# Patient Record
Sex: FEMALE | Race: WHITE | NOT HISPANIC OR LATINO | Employment: OTHER
[De-identification: names, ages, dates, MRNs, and addresses within clinical notes are randomized per-mention and may not be internally consistent; named-entity substitution may affect disease eponyms.]

---

## 2017-06-03 ENCOUNTER — HEALTH MAINTENANCE LETTER (OUTPATIENT)
Age: 67
End: 2017-06-03

## 2017-08-31 ENCOUNTER — APPOINTMENT (OUTPATIENT)
Dept: CT IMAGING | Facility: CLINIC | Age: 67
End: 2017-08-31
Attending: FAMILY MEDICINE
Payer: MEDICARE

## 2017-08-31 ENCOUNTER — HOSPITAL ENCOUNTER (EMERGENCY)
Facility: CLINIC | Age: 67
Discharge: HOME | End: 2017-08-31
Attending: FAMILY MEDICINE | Admitting: FAMILY MEDICINE
Payer: MEDICARE

## 2017-08-31 VITALS
OXYGEN SATURATION: 95 % | HEIGHT: 63 IN | BODY MASS INDEX: 28.53 KG/M2 | RESPIRATION RATE: 20 BRPM | WEIGHT: 161 LBS | TEMPERATURE: 98 F | DIASTOLIC BLOOD PRESSURE: 71 MMHG | SYSTOLIC BLOOD PRESSURE: 133 MMHG

## 2017-08-31 DIAGNOSIS — N30.01 ACUTE CYSTITIS WITH HEMATURIA: ICD-10-CM

## 2017-08-31 LAB
ALBUMIN SERPL-MCNC: 3.6 G/DL (ref 3.4–5)
ALBUMIN UR-MCNC: 30 MG/DL
ALP SERPL-CCNC: 76 U/L (ref 40–150)
ALT SERPL W P-5'-P-CCNC: 31 U/L (ref 0–50)
ANION GAP SERPL CALCULATED.3IONS-SCNC: 13 MMOL/L (ref 3–14)
APPEARANCE UR: ABNORMAL
AST SERPL W P-5'-P-CCNC: 26 U/L (ref 0–45)
BACTERIA #/AREA URNS HPF: ABNORMAL /HPF
BASOPHILS # BLD AUTO: 0 10E9/L (ref 0–0.2)
BASOPHILS NFR BLD AUTO: 0.2 %
BILIRUB SERPL-MCNC: 0.1 MG/DL (ref 0.2–1.3)
BILIRUB UR QL STRIP: NEGATIVE
BUN SERPL-MCNC: 21 MG/DL (ref 7–30)
CALCIUM SERPL-MCNC: 8.8 MG/DL (ref 8.5–10.1)
CHLORIDE SERPL-SCNC: 108 MMOL/L (ref 94–109)
CO2 SERPL-SCNC: 23 MMOL/L (ref 20–32)
COLOR UR AUTO: YELLOW
CREAT SERPL-MCNC: 0.78 MG/DL (ref 0.52–1.04)
DIFFERENTIAL METHOD BLD: NORMAL
EOSINOPHIL # BLD AUTO: 0.2 10E9/L (ref 0–0.7)
EOSINOPHIL NFR BLD AUTO: 2 %
ERYTHROCYTE [DISTWIDTH] IN BLOOD BY AUTOMATED COUNT: 12.5 % (ref 10–15)
GFR SERPL CREATININE-BSD FRML MDRD: 73 ML/MIN/1.7M2
GLUCOSE SERPL-MCNC: 126 MG/DL (ref 70–99)
GLUCOSE UR STRIP-MCNC: NEGATIVE MG/DL
HCT VFR BLD AUTO: 40.3 % (ref 35–47)
HGB BLD-MCNC: 13.8 G/DL (ref 11.7–15.7)
HGB UR QL STRIP: ABNORMAL
IMM GRANULOCYTES # BLD: 0 10E9/L (ref 0–0.4)
IMM GRANULOCYTES NFR BLD: 0.2 %
KETONES UR STRIP-MCNC: NEGATIVE MG/DL
LEUKOCYTE ESTERASE UR QL STRIP: ABNORMAL
LYMPHOCYTES # BLD AUTO: 2.6 10E9/L (ref 0.8–5.3)
LYMPHOCYTES NFR BLD AUTO: 28.9 %
MCH RBC QN AUTO: 32.2 PG (ref 26.5–33)
MCHC RBC AUTO-ENTMCNC: 34.2 G/DL (ref 31.5–36.5)
MCV RBC AUTO: 94 FL (ref 78–100)
MONOCYTES # BLD AUTO: 0.4 10E9/L (ref 0–1.3)
MONOCYTES NFR BLD AUTO: 4.2 %
NEUTROPHILS # BLD AUTO: 5.7 10E9/L (ref 1.6–8.3)
NEUTROPHILS NFR BLD AUTO: 64.5 %
NITRATE UR QL: NEGATIVE
NRBC # BLD AUTO: 0 10*3/UL
NRBC BLD AUTO-RTO: 0 /100
PH UR STRIP: 5.5 PH (ref 5–7)
PLATELET # BLD AUTO: 210 10E9/L (ref 150–450)
POTASSIUM SERPL-SCNC: 3.8 MMOL/L (ref 3.4–5.3)
PROT SERPL-MCNC: 7.2 G/DL (ref 6.8–8.8)
RBC # BLD AUTO: 4.28 10E12/L (ref 3.8–5.2)
RBC #/AREA URNS AUTO: 46 /HPF (ref 0–2)
SODIUM SERPL-SCNC: 144 MMOL/L (ref 133–144)
SOURCE: ABNORMAL
SP GR UR STRIP: 1.02 (ref 1–1.03)
UROBILINOGEN UR STRIP-MCNC: NORMAL MG/DL (ref 0–2)
WBC # BLD AUTO: 8.8 10E9/L (ref 4–11)
WBC #/AREA URNS AUTO: >182 /HPF (ref 0–2)

## 2017-08-31 PROCEDURE — 51798 US URINE CAPACITY MEASURE: CPT

## 2017-08-31 PROCEDURE — 80053 COMPREHEN METABOLIC PANEL: CPT | Performed by: FAMILY MEDICINE

## 2017-08-31 PROCEDURE — 85025 COMPLETE CBC W/AUTO DIFF WBC: CPT | Performed by: FAMILY MEDICINE

## 2017-08-31 PROCEDURE — 74176 CT ABD & PELVIS W/O CONTRAST: CPT

## 2017-08-31 PROCEDURE — 81001 URINALYSIS AUTO W/SCOPE: CPT | Performed by: EMERGENCY MEDICINE

## 2017-08-31 PROCEDURE — 87086 URINE CULTURE/COLONY COUNT: CPT | Performed by: EMERGENCY MEDICINE

## 2017-08-31 PROCEDURE — 25000128 H RX IP 250 OP 636: Performed by: FAMILY MEDICINE

## 2017-08-31 PROCEDURE — 99284 EMERGENCY DEPT VISIT MOD MDM: CPT | Mod: Z6 | Performed by: FAMILY MEDICINE

## 2017-08-31 PROCEDURE — 99284 EMERGENCY DEPT VISIT MOD MDM: CPT | Mod: 25

## 2017-08-31 PROCEDURE — 96365 THER/PROPH/DIAG IV INF INIT: CPT

## 2017-08-31 RX ORDER — CEFPODOXIME PROXETIL 200 MG/1
200 TABLET, FILM COATED ORAL 2 TIMES DAILY
Qty: 20 TABLET | Refills: 0 | Status: SHIPPED | OUTPATIENT
Start: 2017-08-31 | End: 2017-09-10

## 2017-08-31 RX ORDER — CEFTRIAXONE 1 G/1
1 INJECTION, POWDER, FOR SOLUTION INTRAMUSCULAR; INTRAVENOUS ONCE
Status: COMPLETED | OUTPATIENT
Start: 2017-08-31 | End: 2017-08-31

## 2017-08-31 RX ADMIN — CEFTRIAXONE SODIUM 1 G: 1 INJECTION, POWDER, FOR SOLUTION INTRAMUSCULAR; INTRAVENOUS at 20:57

## 2017-08-31 ASSESSMENT — ENCOUNTER SYMPTOMS
FLANK PAIN: 1
DYSURIA: 1
FREQUENCY: 1
WEAKNESS: 1
FEVER: 0
DIFFICULTY URINATING: 1
ABDOMINAL PAIN: 1

## 2017-08-31 NOTE — ED AVS SNAPSHOT
[facility], [place], Emergency Department    [address]    [facility] [address]    Phone:  [phone]    Fax:  [phone]                                       Terri Jones   MRN: [id number]    Department:  [facility], Emergency Department   Date of Visit:  8/31/2017           After Visit Summary Signature Page     I have received my discharge instructions, and my questions have been answered. I have discussed any challenges I see with this plan with the nurse or doctor.    ..........................................................................................................................................  Patient/Patient Representative Signature      ..........................................................................................................................................  Patient Representative Print Name and Relationship to Patient    ..................................................               ................................................  Date                                            Time    ..........................................................................................................................................  Reviewed by Signature/Title    ...................................................              ..............................................  Date                                                            Time

## 2017-08-31 NOTE — ED PROVIDER NOTES
History     Chief Complaint   Patient presents with     Rule out Urinary Tract Infection     Pt c/o fullness, burning and (R) flank pain. Pt states she has had many UTIs since last Sept.     HPI  Terri Jones is a 67 year old female with a history of recurrent UTIs since 09/2016 who presents to the Emergency Department for evaluation of a possible UTI. Patient reports UTIs once per month since 09/2016. She was seen by a [facility] urologist in 03/2017 for this issue and she was told that this may be due to aging and that she should avoid bladder irritants and use an estrogen cream. Today, she describes a warm and dull flank pain on the right side as well as dysuria and increased frequency. She also notes that she has not been able to fully empty her bladder for the past year. Patient reports that she began having cramping abdominal pain today around 12:00 PM. She complains of feeling weak and feverish with these symptoms. Patient finished her most recent antibiotic (Macrobid) about 1 week ago.    Past Medical History:   Diagnosis Date     Osteopenia        Past Surgical History:   Procedure Laterality Date     C LEG/ANKLE SURGERY PROC UNLISTED  1/05    plate and 2 pins placed, rt     HC TOOTH EXTRACTION W/FORCEP  1961    [place] teeth extraction       Family History   Problem Relation Age of Onset     Hypertension Sister      Neurologic Disorder Father      parkinsons     Arthritis Sister      Rheumatoid     Arthritis Maternal Grandmother      Rheumatoid     CANCER Paternal Grandfather      leukemia     Neurologic Disorder Father      epilepsy     Neurologic Disorder Sister      epilepsy     Family History Negative Mother      Family History Negative Sister      Family History Negative Sister      Family History Negative Sister      Family History Negative Brother      Family History Negative Brother      Family History Negative Daughter        Social History   Substance Use Topics     Smoking status:  "Former Smoker     Years: 1.00     Smokeless tobacco: Never Used      Comment: smoked age [age]  less than 5 a day     Alcohol use No       No current facility-administered medications for this encounter.      Current Outpatient Prescriptions   Medication     Nitrofurantoin Monohyd Macro (MACROBID PO)     cefpodoxime (VANTIN) 200 MG tablet     MULTIVITAMIN TABS   OR     cyclobenzaprine (FLEXERIL) 10 MG tablet     EPINEPHrine (EPIPEN) 0.3 MG/0.3ML injection     VITAMIN D 1000 UNIT OR CAPS     VITAMIN C TABS   OR        Allergies   Allergen Reactions     Ciprofloxacin Swelling     Lip swelling     Shellfish Allergy      Sulfa Drugs Swelling         I have reviewed the Medications, Allergies, Past Medical and Surgical History, and Social History in the Epic system.    Review of Systems   Constitutional: Negative for fever.   Gastrointestinal: Positive for abdominal pain (cramping).   Genitourinary: Positive for difficulty urinating, dysuria, flank pain (right) and frequency.   Neurological: Positive for weakness.       Physical Exam   BP: 130/84  Heart Rate: 78  Temp: 98.6  F (37  C)  Resp: 16  Height: 160 cm (5' 3\")  Weight: 73 kg (161 lb)  SpO2: 96 %  Physical Exam   Constitutional: She is oriented to person, place, and time. No distress.   HENT:   Head: Atraumatic.   Mouth/Throat: Oropharynx is clear and moist. No oropharyngeal exudate.   Eyes: Pupils are equal, round, and reactive to light. No scleral icterus.   Cardiovascular: Normal heart sounds and intact distal pulses.    Pulmonary/Chest: Breath sounds normal. No respiratory distress.   Abdominal: Soft. Bowel sounds are normal. There is tenderness in the suprapubic area. There is CVA tenderness.   Musculoskeletal: She exhibits no edema or tenderness.   Neurological: She is alert and oriented to person, place, and time. She has normal reflexes. No cranial nerve deficit. She exhibits normal muscle tone. Coordination normal.   Skin: Skin is warm. No rash noted. She " is not diaphoretic.       ED Course   6:18 PM  The patient was seen and examined by Danny Briones MD in Room 6.     ED Course     Procedures         Critical Care time:  none    Labs Ordered and Resulted from Time of ED Arrival Up to the Time of Departure from the ED   ROUTINE UA WITH MICROSCOPIC REFLEX TO CULTURE - Abnormal; Notable for the following:        Result Value    Blood Urine Moderate (*)     Protein Albumin Urine 30 (*)     Leukocyte Esterase Urine Large (*)     WBC Urine >182 (*)     RBC Urine 46 (*)     Bacteria Urine Few (*)     All other components within normal limits   COMPREHENSIVE METABOLIC PANEL - Abnormal; Notable for the following:     Glucose 126 (*)     Bilirubin Total 0.1 (*)     All other components within normal limits   CBC WITH PLATELETS DIFFERENTIAL   BLADDER SCAN         Assessments & Plan (with Medical Decision Making)       I have reviewed the nursing notes.    I have reviewed the findings, diagnosis, plan and need for follow up with the patient.  Patient with recurrent UTIs I'm concerned about the possibility of underlying anatomic abnormalities discussed the case with the [facility] and she will follow up in their clinic in the meantime patient was given one dose of Rocephin here and will be started on Vantin with close follow-up with primary clinic on culture results or return if marked increase in fevers or weakness.    Discharge Medication List as of 8/31/2017  9:29 PM      START taking these medications    Details   cefpodoxime (VANTIN) 200 MG tablet Take 1 tablet (200 mg) by mouth 2 times daily for 10 days, Disp-20 tablet, R-0, Local Print             Final diagnoses:   Acute cystitis with hematuria   ILisa, sonia serving as a trained medical scribe to document services personally performed by Danny Briones MD, based on the provider's statements to me.      I, Danny Briones MD, was physically present and have reviewed and  verified the accuracy of this note documented by Lisa Mac.         8/31/2017   [facility], [place], EMERGENCY DEPARTMENT     Danny Briones MD  09/01/17 [id number]

## 2017-08-31 NOTE — ED AVS SNAPSHOT
[facility], Emergency Department    [address]    [place] [address]    Phone:  [phone]    Fax:  [phone]                                       Terri Jones   MRN: [id number]    Department:  [facility], Emergency Department   Date of Visit:  8/31/2017           Patient Information     Date Of Birth          1950        Your diagnoses for this visit were:     Acute cystitis with hematuria        You were seen by Danny Briones MD.      Follow-up Information     [facility] with [facility]-up.    Why:  Check culture results in 2 days and to follow up 1st part of next week        Discharge Instructions         Understanding Urinary Tract Infections (UTIs)  Most UTIs are caused by bacteria, although they may also be caused by viruses or fungi. Bacteria from the bowel are the most common source of infection. The infection may start because of any of the following:    Sexual activity. During sex, bacteria can travel from the penis, vagina, or rectum into the urethra.     Bacteria on the skin outside the rectum may travel into the urethra. This is more common in women since the rectum and urethra are closer to each other than in men. Wiping from front to back after using the toilet and keeping the area clean can help prevent germs from getting to the urethra.    Blockage of urine flow through the urinary tract. If urine sits too long, germs may start to grow out of control.      Parts of the urinary tract  The infection can occur in any part of the urinary tract.    The kidneys collect and store urine.    The ureters carry urine from the kidneys to the bladder.    The bladder holds urine until you are ready to let it out.    The urethra carries urine from the bladder out of the body. It is shorter in women, so bacteria can move through it more easily. The urethra is longer in men, so a UTI is less likely to reach the bladder or kidneys in men.  Date  Last Reviewed: 1/1/2017 2000-2017 [employer]. [address] [place] [address], [place] [address]. All rights reserved. This information is not intended as a substitute for professional medical care. Always follow your healthcare professional's instructions.          24 Hour Appointment Hotline       To make an appointment at any [facility], call [phone] ([phone]). If you don't have a family doctor or clinic, we will help you find one. [facility] are conveniently located to serve the needs of you and your family.             Review of your medicines      START taking        Dose / Directions Last dose taken    cefpodoxime 200 MG tablet   Commonly known as:  VANTIN   Dose:  200 mg   Quantity:  20 tablet        Take 1 tablet (200 mg) by mouth 2 times daily for 10 days   Refills:  0          Our records show that you are taking the medicines listed below. If these are incorrect, please call your family doctor or clinic.        Dose / Directions Last dose taken    cyclobenzaprine 10 MG tablet   Commonly known as:  FLEXERIL   Dose:  10 mg   Quantity:  14 tablet        Take 1 tablet by mouth nightly as needed for muscle spasms.   Refills:  1        EPINEPHrine 0.3 MG/0.3ML injection   Commonly known as:  EPIPEN   Dose:  0.3 mg   Quantity:  2 each        Inject 0.3 mLs into the muscle once as needed for anaphylaxis for 1 dose.   Refills:  3        MACROBID PO        Refills:  0        MULTIVITAMIN TABS   OR        1 TABLET DAILY   Refills:  0        VITAMIN C TABS   OR        1 TABLET DAILY   Refills:  0        vitamin D 1000 UNITS capsule        1/2 capsule daily.   Refills:  0                Prescriptions were sent or printed at these locations (1 Prescription)                   Other Prescriptions                Printed at Department/Unit printer (1 of 1)         cefpodoxime (VANTIN) 200 MG tablet                Procedures and tests performed during your visit     Abd/pelvis CT no  contrast - Stone Protocol    Bladder scan    CBC with platelets differential    Comprehensive metabolic panel    UA with Microscopic reflex to Culture    Urine Culture Aerobic Bacterial      Orders Needing Specimen Collection     None      Pending Results     Date and Time Order Name Status Description    8/31/2017 1825 Abd/pelvis CT no contrast - Stone Protocol Preliminary     8/31/2017 1731 Urine Culture Aerobic Bacterial Preliminary             Pending Culture Results     Date and Time Order Name Status Description    8/31/2017 1731 Urine Culture Aerobic Bacterial Preliminary             Pending Results Instructions     If you had any lab results that were not finalized at the time of your Discharge, you can call the ED Lab Result RN at [phone]. You will be contacted by this team for any positive Lab results or changes in treatment. The nurses are available 7 days a week from 10A to 6:30P.  You can leave a message 24 hours per day and they will return your call.        Thank you for choosing [facility]       Thank you for choosing [facility] for your care. Our goal is always to provide you with excellent care. Hearing back from our patients is one way we can continue to improve our services. Please take a few minutes to complete the written survey that you may receive in the mail after you visit with us. Thank you!        [employer] Information     [facility] gives you secure access to your electronic health record. If you see a primary care provider, you can also send messages to your care team and make appointments. If you have questions, please call your primary care clinic.  If you do not have a primary care provider, please call [phone] and they will assist you.        Care EveryWhere ID     This is your Care EveryWhere ID. This could be used by other organizations to access your [facility] medical records  [id number]        Equal Access to Services     JABARI DUMAS AH: kathya Lipscomb  breonna álvarez waxay idiin hayaan adeeg kharash la'aan ah. So [facility] [phone].    ATENCIÓN: Si habla rohan, tiene a person disposición servicios gratuitos de asistencia lingüística. Llame al [phone].    We comply with applicable federal civil rights laws and [place] laws. We do not discriminate on the basis of race, color, national origin, age, disability sex, sexual orientation or gender identity.            After Visit Summary       This is your record. Keep this with you and show to your community pharmacist(s) and doctor(s) at your next visit.

## 2017-09-01 LAB
BACTERIA SPEC CULT: NORMAL
Lab: NORMAL
SPECIMEN SOURCE: NORMAL

## 2017-09-01 NOTE — ED NOTES
Patient presents with lower abdominal cramping and right flank pain; patient also verbalized mild burning with urination and states it takes a long time for her to void and when she voids streams stopped and she does not feel she is emptying; patient verbalized she had UTI and completed antibiotic treatment on Friday, 8/18/17 and felt fine after but today at about noon started to experience symptoms listed above.

## 2018-06-09 ENCOUNTER — HEALTH MAINTENANCE LETTER (OUTPATIENT)
Age: 68
End: 2018-06-09

## 2019-09-29 ENCOUNTER — HEALTH MAINTENANCE LETTER (OUTPATIENT)
Age: 69
End: 2019-09-29

## 2020-03-15 ENCOUNTER — HEALTH MAINTENANCE LETTER (OUTPATIENT)
Age: 70
End: 2020-03-15

## 2021-01-14 ENCOUNTER — HEALTH MAINTENANCE LETTER (OUTPATIENT)
Age: 71
End: 2021-01-14

## 2021-05-09 ENCOUNTER — HEALTH MAINTENANCE LETTER (OUTPATIENT)
Age: 71
End: 2021-05-09

## 2021-10-24 ENCOUNTER — HEALTH MAINTENANCE LETTER (OUTPATIENT)
Age: 71
End: 2021-10-24

## 2022-02-13 ENCOUNTER — HEALTH MAINTENANCE LETTER (OUTPATIENT)
Age: 72
End: 2022-02-13

## 2022-06-05 ENCOUNTER — HEALTH MAINTENANCE LETTER (OUTPATIENT)
Age: 72
End: 2022-06-05

## 2022-09-27 NOTE — DISCHARGE INSTRUCTIONS
Understanding Urinary Tract Infections (UTIs)  Most UTIs are caused by bacteria, although they may also be caused by viruses or fungi. Bacteria from the bowel are the most common source of infection. The infection may start because of any of the following:    Sexual activity. During sex, bacteria can travel from the penis, vagina, or rectum into the urethra.     Bacteria on the skin outside the rectum may travel into the urethra. This is more common in women since the rectum and urethra are closer to each other than in men. Wiping from front to back after using the toilet and keeping the area clean can help prevent germs from getting to the urethra.    Blockage of urine flow through the urinary tract. If urine sits too long, germs may start to grow out of control.      Parts of the urinary tract  The infection can occur in any part of the urinary tract.    The kidneys collect and store urine.    The ureters carry urine from the kidneys to the bladder.    The bladder holds urine until you are ready to let it out.    The urethra carries urine from the bladder out of the body. It is shorter in women, so bacteria can move through it more easily. The urethra is longer in men, so a UTI is less likely to reach the bladder or kidneys in men.  Date Last Reviewed: 1/1/2017 2000-2017 [employer]. [address] [place] [address], [place] [address]. All rights reserved. This information is not intended as a substitute for professional medical care. Always follow your healthcare professional's instructions.         Valtrex Pregnancy And Lactation Text: this medication is Pregnancy Category B and is considered safe during pregnancy. This medication is not directly found in breast milk but it's metabolite acyclovir is present.

## 2022-10-15 ENCOUNTER — HEALTH MAINTENANCE LETTER (OUTPATIENT)
Age: 72
End: 2022-10-15

## 2023-03-26 ENCOUNTER — HEALTH MAINTENANCE LETTER (OUTPATIENT)
Age: 73
End: 2023-03-26

## 2023-06-11 ENCOUNTER — HEALTH MAINTENANCE LETTER (OUTPATIENT)
Age: 73
End: 2023-06-11

## 2024-03-27 ENCOUNTER — APPOINTMENT (OUTPATIENT)
Dept: CT IMAGING | Facility: CLINIC | Age: 74
End: 2024-03-27
Payer: COMMERCIAL

## 2024-03-27 ENCOUNTER — HOSPITAL ENCOUNTER (EMERGENCY)
Facility: CLINIC | Age: 74
Discharge: HOME | End: 2024-03-27
Attending: FAMILY MEDICINE | Admitting: FAMILY MEDICINE
Payer: COMMERCIAL

## 2024-03-27 VITALS
RESPIRATION RATE: 16 BRPM | BODY MASS INDEX: 29.88 KG/M2 | DIASTOLIC BLOOD PRESSURE: 83 MMHG | TEMPERATURE: 97.3 F | WEIGHT: 168.6 LBS | HEIGHT: 63 IN | SYSTOLIC BLOOD PRESSURE: 151 MMHG | OXYGEN SATURATION: 95 % | HEART RATE: 70 BPM

## 2024-03-27 DIAGNOSIS — R10.31 ABDOMINAL PAIN, RIGHT LOWER QUADRANT: ICD-10-CM

## 2024-03-27 DIAGNOSIS — K59.00 CONSTIPATION, UNSPECIFIED CONSTIPATION TYPE: ICD-10-CM

## 2024-03-27 LAB
ALBUMIN SERPL BCG-MCNC: 4.4 G/DL (ref 3.5–5.2)
ALP SERPL-CCNC: 80 U/L (ref 40–150)
ALT SERPL W P-5'-P-CCNC: 28 U/L (ref 0–50)
ANION GAP SERPL CALCULATED.3IONS-SCNC: 10 MMOL/L (ref 7–15)
AST SERPL W P-5'-P-CCNC: 29 U/L (ref 0–45)
BASOPHILS # BLD AUTO: 0 10E3/UL (ref 0–0.2)
BASOPHILS NFR BLD AUTO: 1 %
BILIRUB SERPL-MCNC: 0.3 MG/DL
BUN SERPL-MCNC: 17.3 MG/DL (ref 8–23)
CALCIUM SERPL-MCNC: 9.6 MG/DL (ref 8.8–10.2)
CHLORIDE SERPL-SCNC: 104 MMOL/L (ref 98–107)
CREAT SERPL-MCNC: 0.66 MG/DL (ref 0.51–0.95)
DEPRECATED HCO3 PLAS-SCNC: 26 MMOL/L (ref 22–29)
EGFRCR SERPLBLD CKD-EPI 2021: 90 ML/MIN/1.73M2
EOSINOPHIL # BLD AUTO: 0.1 10E3/UL (ref 0–0.7)
EOSINOPHIL NFR BLD AUTO: 2 %
ERYTHROCYTE [DISTWIDTH] IN BLOOD BY AUTOMATED COUNT: 12.7 % (ref 10–15)
GLUCOSE SERPL-MCNC: 93 MG/DL (ref 70–99)
HCT VFR BLD AUTO: 43.3 % (ref 35–47)
HGB BLD-MCNC: 14.7 G/DL (ref 11.7–15.7)
IMM GRANULOCYTES # BLD: 0 10E3/UL
IMM GRANULOCYTES NFR BLD: 0 %
LIPASE SERPL-CCNC: 48 U/L (ref 13–60)
LYMPHOCYTES # BLD AUTO: 1.8 10E3/UL (ref 0.8–5.3)
LYMPHOCYTES NFR BLD AUTO: 30 %
MCH RBC QN AUTO: 31.4 PG (ref 26.5–33)
MCHC RBC AUTO-ENTMCNC: 33.9 G/DL (ref 31.5–36.5)
MCV RBC AUTO: 93 FL (ref 78–100)
MONOCYTES # BLD AUTO: 0.5 10E3/UL (ref 0–1.3)
MONOCYTES NFR BLD AUTO: 8 %
NEUTROPHILS # BLD AUTO: 3.7 10E3/UL (ref 1.6–8.3)
NEUTROPHILS NFR BLD AUTO: 59 %
NRBC # BLD AUTO: 0 10E3/UL
NRBC BLD AUTO-RTO: 0 /100
PLATELET # BLD AUTO: 233 10E3/UL (ref 150–450)
POTASSIUM SERPL-SCNC: 4 MMOL/L (ref 3.4–5.3)
PROT SERPL-MCNC: 7.2 G/DL (ref 6.4–8.3)
RBC # BLD AUTO: 4.68 10E6/UL (ref 3.8–5.2)
SODIUM SERPL-SCNC: 140 MMOL/L (ref 135–145)
WBC # BLD AUTO: 6.2 10E3/UL (ref 4–11)

## 2024-03-27 PROCEDURE — 80053 COMPREHEN METABOLIC PANEL: CPT

## 2024-03-27 PROCEDURE — 250N000009 HC RX 250: Performed by: FAMILY MEDICINE

## 2024-03-27 PROCEDURE — 250N000011 HC RX IP 250 OP 636: Performed by: FAMILY MEDICINE

## 2024-03-27 PROCEDURE — 85025 COMPLETE CBC W/AUTO DIFF WBC: CPT

## 2024-03-27 PROCEDURE — 74177 CT ABD & PELVIS W/CONTRAST: CPT

## 2024-03-27 PROCEDURE — 99284 EMERGENCY DEPT VISIT MOD MDM: CPT | Mod: GC | Performed by: FAMILY MEDICINE

## 2024-03-27 PROCEDURE — 36415 COLL VENOUS BLD VENIPUNCTURE: CPT

## 2024-03-27 PROCEDURE — 83690 ASSAY OF LIPASE: CPT

## 2024-03-27 PROCEDURE — 99285 EMERGENCY DEPT VISIT HI MDM: CPT | Mod: 25 | Performed by: FAMILY MEDICINE

## 2024-03-27 RX ORDER — IOPAMIDOL 755 MG/ML
100 INJECTION, SOLUTION INTRAVASCULAR ONCE
Status: COMPLETED | OUTPATIENT
Start: 2024-03-27 | End: 2024-03-27

## 2024-03-27 RX ORDER — POLYETHYLENE GLYCOL 3350 17 G/17G
1 POWDER, FOR SOLUTION ORAL DAILY
Qty: 527 G | Refills: 0 | Status: SHIPPED | OUTPATIENT
Start: 2024-03-27 | End: 2024-04-26

## 2024-03-27 RX ADMIN — SODIUM CHLORIDE 42 ML: 9 INJECTION, SOLUTION INTRAVENOUS at 11:41

## 2024-03-27 RX ADMIN — IOPAMIDOL 85 ML: 755 INJECTION, SOLUTION INTRAVENOUS at 11:40

## 2024-03-27 ASSESSMENT — COLUMBIA-SUICIDE SEVERITY RATING SCALE - C-SSRS
6. HAVE YOU EVER DONE ANYTHING, STARTED TO DO ANYTHING, OR PREPARED TO DO ANYTHING TO END YOUR LIFE?: YES
2. HAVE YOU ACTUALLY HAD ANY THOUGHTS OF KILLING YOURSELF IN THE PAST MONTH?: NO
1. IN THE PAST MONTH, HAVE YOU WISHED YOU WERE DEAD OR WISHED YOU COULD GO TO SLEEP AND NOT WAKE UP?: NO

## 2024-03-27 ASSESSMENT — ACTIVITIES OF DAILY LIVING (ADL)
ADLS_ACUITY_SCORE: 35
ADLS_ACUITY_SCORE: 35
ADLS_ACUITY_SCORE: 33

## 2024-03-27 NOTE — DISCHARGE INSTRUCTIONS
You were seen for abdominal pain and bloating. Your workup was reassuring and it appears that your symptoms are likely related to gas and bloating. I put in some Simethicone (Gas-X) and polyglycol (Miralax) for you to help with that, you can also buy these (or fiber pills) over the counter. The simethicone also comes in chewable tabs over the counter as well!     For the glasses, they were from a website called [employer] and there's many fun options there.

## 2024-03-27 NOTE — ED PROVIDER NOTES
"ED Provider Note  [facility]      History     Chief Complaint   Patient presents with    Abdominal Pain     HPI  Terri Jones is an otherwise healthy 74 year old female s/p appendectomy (2010) who presents with 3 days of worsening RLQ abdominal pressure. Patient notes that symptom started suddenly three days ago when she began feeling a \"ballooning, tight\" pressure in her RLQ with some residual pain. Notes that this sensation felt almost identical to her prior appendicitis symptoms and, over the last few days, has progressed from a 3/10 to 7/10 in pain/pressure. Additionally notes about one week of constipation (which is new for her) and, while her last BM was earlier this morning, states that it was harder to stool and it looked \"more impacted\" than normal but without mucus/hematochezia. No abdominal pain (w/ and w/o eating), nausea, vomiting, diarrhea, myalgias, fever, sore throat/congestion. Ate some toast this morning without issue. Does not currently take any medication, has uterine prolapse but controlled with pessary    Past Medical History  Past Medical History:   Diagnosis Date    Osteopenia      Past Surgical History:   Procedure Laterality Date    HC TOOTH EXTRACTION W/FORCEP  1961    [place] teeth extraction    ZZC LEG/ANKLE SURGERY PROC UNLISTED  1/05    plate and 2 pins placed, rt     polyethylene glycol (MIRALAX) 17 GM/Dose powder  simethicone (MYLICON) 40 MG/0.6ML liquid  cyclobenzaprine (FLEXERIL) 10 MG tablet  EPINEPHrine (EPIPEN) 0.3 MG/0.3ML injection  MULTIVITAMIN TABS   OR  Nitrofurantoin Monohyd Macro (MACROBID PO)  VITAMIN C TABS   OR  VITAMIN D 1000 UNIT OR CAPS      Allergies   Allergen Reactions    Ciprofloxacin Swelling     Lip swelling    Shellfish Allergy     Sulfa Antibiotics Swelling     Family History  Family History   Problem Relation Age of Onset    Hypertension Sister     Neurologic Disorder Father         parkinsons    Arthritis Sister         " "Rheumatoid    Arthritis Maternal Grandmother         Rheumatoid    Cancer Paternal Grandfather         leukemia    Neurologic Disorder Father         epilepsy    Neurologic Disorder Sister         epilepsy    Family History Negative Mother     Family History Negative Sister     Family History Negative Sister     Family History Negative Sister     Family History Negative Brother     Family History Negative Brother     Family History Negative Daughter      Social History   Social History     Tobacco Use    Smoking status: Former     Years: 1     Types: Cigarettes    Smokeless tobacco: Never    Tobacco comments:     smoked age [age]  less than 5 a day   Substance Use Topics    Alcohol use: No    Drug use: No      Past medical history, past surgical history, medications, allergies, family history, and social history were reviewed with the patient. No additional pertinent items.      A medically appropriate review of systems was performed with pertinent positives and negatives noted in the HPI, and all other systems negative.    Physical Exam   BP: (!) 143/82  Pulse: 71  Temp: 97.9  F (36.6  C)  Resp: 14  Height: 160 cm (5' 3\")  Weight: 76.5 kg (168 lb 9.6 oz)  SpO2: 96 %    GENERAL APPEARANCE: alert and moderate distress     EYES: PERRL, EOMI, sclera non-icteric     HENT: oral exam benign, mucus membranes intact, without ulcers or lesions     NECK: no adenopathy or asymmetry, thyroid normal to palpation     RESP: lungs clear to auscultation - no rales, rhonchi or wheezes     CV: regular rates and rhythm, no murmurs, rubs, or gallop     GI: abdomen is soft without distention. Central upper quadrant tenderness and rebound noted as well as tenderness in RLQ (without guarding). No other pain/tenderness on exam, no radiation of symptoms. Normoactive bowel sounds.     MS: extremities normal- no gross deformities noted; normal muscle tone.     SKIN: no suspicious lesions or rashes     ED Course, Procedures, & Data    " "  Procedures          Results for orders placed or performed during the hospital encounter of 03/27/24   CT Abdomen Pelvis w Contrast     Status: None (Preliminary result)    Narrative    CT ABDOMEN PELVIS WITH CONTRAST 3/27/2024 11:48 AM    CLINICAL HISTORY: Right lower quadrant abdominal pain that feels  \"pressured\", history of prior appendectomy 2010, peritoneal signs  centrally with guarding although interestingly no associated pain.    TECHNIQUE: CT scan of the abdomen and pelvis was performed following  injection of IV contrast. Multiplanar reformats were obtained. Dose  reduction techniques were used.  CONTRAST: 85mL Isovue 370    COMPARISON: CT abdomen and pelvis 8/31/2017.    FINDINGS:   LOWER CHEST: Moderate hiatal hernia again noted.    HEPATOBILIARY: A few stable tiny hypodensities suggestive of cysts  within the liver. Some are too small for characterization. Area of  nodular enhancement at the right inferior liver is 0.9 cm and may be a  hemangioma series 5 image 82. Gallbladder unremarkable. Another  hemangioma at the medial right hepatic dome measuring 2.3 cm image 23.    PANCREAS: Normal.    SPLEEN: Normal.    ADRENAL GLANDS: Normal.    KIDNEYS/BLADDER: No urolithiasis or hydronephrosis. A few renal cysts  bilaterally without specific imaging follow-up recommended. Bladder  appears unremarkable.    BOWEL: Prior appendectomy. No bowel obstruction or acute inflammation  of the bowel. No abscess or free air.    PELVIC ORGANS: No acute abnormality. Pessary identified.    ADDITIONAL FINDINGS: None.    MUSCULOSKELETAL: Mild degenerative changes of the spine.      Impression    IMPRESSION:   1.  No acute abnormality identified.  2.  Moderate hiatal hernia.   Comprehensive metabolic panel     Status: Normal   Result Value Ref Range    Sodium 140 135 - 145 mmol/L    Potassium 4.0 3.4 - 5.3 mmol/L    Carbon Dioxide (CO2) 26 22 - 29 mmol/L    Anion Gap 10 7 - 15 mmol/L    Urea Nitrogen 17.3 8.0 - 23.0 mg/dL    " Creatinine 0.66 0.51 - 0.95 mg/dL    GFR Estimate 90 >60 mL/min/1.73m2    Calcium 9.6 8.8 - 10.2 mg/dL    Chloride 104 98 - 107 mmol/L    Glucose 93 70 - 99 mg/dL    Alkaline Phosphatase 80 40 - 150 U/L    AST 29 0 - 45 U/L    ALT 28 0 - 50 U/L    Protein Total 7.2 6.4 - 8.3 g/dL    Albumin 4.4 3.5 - 5.2 g/dL    Bilirubin Total 0.3 <=1.2 mg/dL   Lipase     Status: Normal   Result Value Ref Range    Lipase 48 13 - 60 U/L   CBC with platelets and differential     Status: None   Result Value Ref Range    WBC Count 6.2 4.0 - 11.0 10e3/uL    RBC Count 4.68 3.80 - 5.20 10e6/uL    Hemoglobin 14.7 11.7 - 15.7 g/dL    Hematocrit 43.3 35.0 - 47.0 %    MCV 93 78 - 100 fL    MCH 31.4 26.5 - 33.0 pg    MCHC 33.9 31.5 - 36.5 g/dL    RDW 12.7 10.0 - 15.0 %    Platelet Count 233 150 - 450 10e3/uL    % Neutrophils 59 %    % Lymphocytes 30 %    % Monocytes 8 %    % Eosinophils 2 %    % Basophils 1 %    % Immature Granulocytes 0 %    NRBCs per 100 WBC 0 <1 /100    Absolute Neutrophils 3.7 1.6 - 8.3 10e3/uL    Absolute Lymphocytes 1.8 0.8 - 5.3 10e3/uL    Absolute Monocytes 0.5 0.0 - 1.3 10e3/uL    Absolute Eosinophils 0.1 0.0 - 0.7 10e3/uL    Absolute Basophils 0.0 0.0 - 0.2 10e3/uL    Absolute Immature Granulocytes 0.0 <=0.4 10e3/uL    Absolute NRBCs 0.0 10e3/uL   CBC with platelets differential     Status: None    Narrative    The following orders were created for panel order CBC with platelets differential.  Procedure                               Abnormality         Status                     ---------                               -----------         ------                     CBC with platelets and d...[[id number]]                      Final result                 Please view results for these tests on the individual orders.     Medications   sodium chloride 0.9 % bag 100 mL for CT (42 mLs Intravenous $Given 3/27/24 1141)   iopamidol (ISOVUE-370) solution 100 mL (85 mLs Intravenous $Given 3/27/24 1140)     Labs Ordered and  Resulted from Time of ED Arrival to Time of ED Departure   COMPREHENSIVE METABOLIC PANEL - Normal       Result Value    Sodium 140      Potassium 4.0      Carbon Dioxide (CO2) 26      Anion Gap 10      Urea Nitrogen 17.3      Creatinine 0.66      GFR Estimate 90      Calcium 9.6      Chloride 104      Glucose 93      Alkaline Phosphatase 80      AST 29      ALT 28      Protein Total 7.2      Albumin 4.4      Bilirubin Total 0.3     LIPASE - Normal    Lipase 48     CBC WITH PLATELETS AND DIFFERENTIAL    WBC Count 6.2      RBC Count 4.68      Hemoglobin 14.7      Hematocrit 43.3      MCV 93      MCH 31.4      MCHC 33.9      RDW 12.7      Platelet Count 233      % Neutrophils 59      % Lymphocytes 30      % Monocytes 8      % Eosinophils 2      % Basophils 1      % Immature Granulocytes 0      NRBCs per 100 WBC 0      Absolute Neutrophils 3.7      Absolute Lymphocytes 1.8      Absolute Monocytes 0.5      Absolute Eosinophils 0.1      Absolute Basophils 0.0      Absolute Immature Granulocytes 0.0      Absolute NRBCs 0.0       CT Abdomen Pelvis w Contrast   Preliminary Result   IMPRESSION:    1.  No acute abnormality identified.   2.  Moderate hiatal hernia.             Critical care was not performed.     Medical Decision Making  The patient's presentation was of moderate complexity (an acute illness with systemic symptoms).    The patient's evaluation involved:  ordering and/or review of 3+ test(s) in this encounter (see separate area of note for details)    The patient's management necessitated moderate risk (prescription drug management including medications given in the ED).    Assessment & Plan      Terri Jones is an otherwise healthy 74 year old female s/p appendectomy (2010) who presents with 3 days of worsening RLQ abdominal pressure. Exam notable for some epigastric tenderness with guarding and mild RLQ tenderness but without bloating, distention, skin changes, or radiation. Remainder of exam was  "unremarkable. Initial differential diagnosis included but was not restricted to cholecystitis or cholelithiasis, cholangitis, pancreatitis, gastritis, gastroenteritis,  peptic ulcer disease, duodenitis, small bowel obstruction, intestinal ischemia, atypical cardiac presentation, AAA, pyelonephritis, ureterolithiasis, as well as numerous other life-threatening and the life-threatening causes of epigastric and right  quadrant pain.     Workup and imaging reassuring - CBC, electrolytes, LFTs, lipase all within normal limits. CT Abdomen without acute abnormality and stable hernia -  with a normal gallbladder, no evidence of urolithiasis/hydronephrosis with normal bladder, no bowel obstruction/acute bowel inflammation, abscess, or free air. While guarding initially concerning for peritonitis or inflammation, gas distribution within the bowels appears consistent with location of pain and \"pressured\" feeling reported. As such - given reported  history and otherwise reassuring workup - suspect symptoms are from buildup of gas and constipation. Discussed these findings with patient and recommended patient try simethicone and any stool softening agent (such as fiber or polyglycol) to assess for relief. Patient was provided with education and a prescription for the above as well as with names of over the counter variants. Patient was slightly hypertensive on evaluation, however states she has good blood pressure control  and is therefore likely related to anxiety of ED evaluation. Patient was advised to follow up with her PCP after trying these supportive treatments and to schedule an appointment if symptoms did not improve.     I have reviewed the nursing notes. I have reviewed the findings, diagnosis, plan and need for follow up with the patient.    Discharge Medication List as of 3/27/2024 12:33 PM        START taking these medications    Details   polyethylene glycol (MIRALAX) 17 GM/Dose powder Take 17 g (1 Capful) by " mouth daily for 30 days, Disp-527 g, R-0, E-Prescribe      simethicone (MYLICON) 40 MG/0.6ML liquid Take 0.6 mLs (40 mg) by mouth 3 times daily as needed (gas pain), Disp-30 mL, R-0, E-Prescribe         This data collected with the Resident working in the Emergency Department.  Patient was seen and evaluated by myself and I repeated the history and physical exam with the patient.  The plan of care was discussed with them.  The key portions of the note including the entire assessment and plan reflect my documentation.       Final diagnoses:   Abdominal pain, right lower quadrant - 2/2 gas, constipation   Constipation, unspecified constipation type     Gabrielle Ortiz MD  Resident    Danny Briones  [facility] EMERGENCY DEPARTMENT  3/27/2024     Danny Briones MD  03/29/24 [id number]

## 2024-03-27 NOTE — ED TRIAGE NOTES
"      Pt presents for abdominal pain on RLQ. Pt describes the pain as \"dull\".  Pain has been present for 2-3 days with worsening today.  NO OTC meds taken for pain.  No known injury.  Pain seems familiar to when she had her appendix removal (2010). Pt states normal BM. (Last one this am approx 0700).   "

## 2024-05-26 ENCOUNTER — HEALTH MAINTENANCE LETTER (OUTPATIENT)
Age: 74
End: 2024-05-26

## 2024-08-04 ENCOUNTER — HEALTH MAINTENANCE LETTER (OUTPATIENT)
Age: 74
End: 2024-08-04

## 2025-06-14 ENCOUNTER — HEALTH MAINTENANCE LETTER (OUTPATIENT)
Age: 75
End: 2025-06-14

## 2025-08-16 ENCOUNTER — HEALTH MAINTENANCE LETTER (OUTPATIENT)
Age: 75
End: 2025-08-16